# Patient Record
Sex: FEMALE | Race: WHITE | NOT HISPANIC OR LATINO | Employment: FULL TIME | URBAN - METROPOLITAN AREA
[De-identification: names, ages, dates, MRNs, and addresses within clinical notes are randomized per-mention and may not be internally consistent; named-entity substitution may affect disease eponyms.]

---

## 2019-04-22 ENCOUNTER — OFFICE VISIT (OUTPATIENT)
Dept: URGENT CARE | Facility: CLINIC | Age: 59
End: 2019-04-22
Payer: COMMERCIAL

## 2019-04-22 VITALS
WEIGHT: 142 LBS | BODY MASS INDEX: 23.66 KG/M2 | TEMPERATURE: 98.2 F | DIASTOLIC BLOOD PRESSURE: 72 MMHG | HEART RATE: 85 BPM | HEIGHT: 65 IN | OXYGEN SATURATION: 99 % | SYSTOLIC BLOOD PRESSURE: 110 MMHG | RESPIRATION RATE: 20 BRPM

## 2019-04-22 DIAGNOSIS — R12 HEARTBURN: Primary | ICD-10-CM

## 2019-04-22 PROCEDURE — 99213 OFFICE O/P EST LOW 20 MIN: CPT | Performed by: PHYSICIAN ASSISTANT

## 2019-04-22 PROCEDURE — 93005 ELECTROCARDIOGRAM TRACING: CPT | Performed by: PHYSICIAN ASSISTANT

## 2019-04-22 RX ORDER — CHLORAL HYDRATE 500 MG
1000 CAPSULE ORAL DAILY
COMMUNITY

## 2019-04-22 RX ORDER — FAMOTIDINE 20 MG/1
20 TABLET, FILM COATED ORAL 2 TIMES DAILY
Qty: 28 TABLET | Refills: 0 | Status: SHIPPED | OUTPATIENT
Start: 2019-04-22 | End: 2021-10-08

## 2019-04-22 RX ORDER — FLUOXETINE 10 MG/1
10 CAPSULE ORAL DAILY
COMMUNITY

## 2019-04-22 RX ORDER — DOCUSATE SODIUM 100 MG/1
100 CAPSULE, LIQUID FILLED ORAL 2 TIMES DAILY
COMMUNITY

## 2019-04-22 RX ORDER — MAGNESIUM HYDROXIDE/ALUMINUM HYDROXICE/SIMETHICONE 120; 1200; 1200 MG/30ML; MG/30ML; MG/30ML
30 SUSPENSION ORAL ONCE
Status: COMPLETED | OUTPATIENT
Start: 2019-04-22 | End: 2019-04-22

## 2019-04-22 RX ORDER — VALACYCLOVIR HYDROCHLORIDE 1 G/1
1000 TABLET, FILM COATED ORAL 2 TIMES DAILY
COMMUNITY

## 2019-04-22 RX ORDER — ATORVASTATIN CALCIUM 20 MG/1
20 TABLET, FILM COATED ORAL DAILY
COMMUNITY

## 2019-04-22 RX ORDER — BUTALBITAL/ASPIRIN/CAFFEINE 50-325-40
1 CAPSULE ORAL EVERY 4 HOURS PRN
COMMUNITY

## 2019-04-22 RX ORDER — WHEAT DEXTRIN 3 G/3.8 G
POWDER (GRAM) ORAL
COMMUNITY

## 2019-04-22 RX ORDER — DICYCLOMINE HYDROCHLORIDE 10 MG/1
10 CAPSULE ORAL ONCE
Status: COMPLETED | OUTPATIENT
Start: 2019-04-22 | End: 2019-04-22

## 2019-04-22 RX ORDER — ELECTROLYTES/DEXTROSE
SOLUTION, ORAL ORAL
COMMUNITY

## 2019-04-22 RX ADMIN — MAGNESIUM HYDROXIDE/ALUMINUM HYDROXICE/SIMETHICONE 30 ML: 120; 1200; 1200 SUSPENSION ORAL at 14:20

## 2019-04-22 RX ADMIN — DICYCLOMINE HYDROCHLORIDE 10 MG: 10 CAPSULE ORAL at 14:21

## 2019-04-23 LAB
ATRIAL RATE: 81 BPM
P AXIS: 74 DEGREES
PR INTERVAL: 184 MS
QRS AXIS: 66 DEGREES
QRSD INTERVAL: 88 MS
QT INTERVAL: 368 MS
QTC INTERVAL: 427 MS
T WAVE AXIS: 59 DEGREES
VENTRICULAR RATE: 81 BPM

## 2019-04-23 PROCEDURE — 93010 ELECTROCARDIOGRAM REPORT: CPT | Performed by: INTERNAL MEDICINE

## 2021-04-13 DIAGNOSIS — Z23 ENCOUNTER FOR IMMUNIZATION: ICD-10-CM

## 2021-10-08 ENCOUNTER — OFFICE VISIT (OUTPATIENT)
Dept: URGENT CARE | Facility: CLINIC | Age: 61
End: 2021-10-08
Payer: COMMERCIAL

## 2021-10-08 VITALS
DIASTOLIC BLOOD PRESSURE: 81 MMHG | HEIGHT: 65 IN | HEART RATE: 91 BPM | RESPIRATION RATE: 18 BRPM | BODY MASS INDEX: 22.99 KG/M2 | SYSTOLIC BLOOD PRESSURE: 135 MMHG | WEIGHT: 138 LBS | TEMPERATURE: 97.9 F | OXYGEN SATURATION: 99 %

## 2021-10-08 DIAGNOSIS — H65.03 NON-RECURRENT ACUTE SEROUS OTITIS MEDIA OF BOTH EARS: Primary | ICD-10-CM

## 2021-10-08 PROCEDURE — 99213 OFFICE O/P EST LOW 20 MIN: CPT | Performed by: EMERGENCY MEDICINE

## 2021-10-08 RX ORDER — OMEPRAZOLE 40 MG/1
40 CAPSULE, DELAYED RELEASE ORAL DAILY
COMMUNITY
Start: 2021-08-17

## 2021-10-08 RX ORDER — PRAVASTATIN SODIUM 40 MG
40 TABLET ORAL DAILY
COMMUNITY
Start: 2021-10-05

## 2021-10-08 RX ORDER — PREDNISONE 10 MG/1
TABLET ORAL
Qty: 27 TABLET | Refills: 0 | Status: SHIPPED | OUTPATIENT
Start: 2021-10-08

## 2022-09-09 ENCOUNTER — HOSPITAL ENCOUNTER (OUTPATIENT)
Dept: CT IMAGING | Facility: HOSPITAL | Age: 62
End: 2022-09-09
Payer: COMMERCIAL

## 2022-09-09 DIAGNOSIS — R10.9 STOMACH ACHE: ICD-10-CM

## 2022-09-09 DIAGNOSIS — R19.5 ABNORMAL FECES: ICD-10-CM

## 2022-09-09 PROCEDURE — 74177 CT ABD & PELVIS W/CONTRAST: CPT

## 2022-09-09 PROCEDURE — G1004 CDSM NDSC: HCPCS

## 2022-09-09 RX ADMIN — IOHEXOL 85 ML: 350 INJECTION, SOLUTION INTRAVENOUS at 13:29

## 2022-09-22 ENCOUNTER — HOSPITAL ENCOUNTER (OUTPATIENT)
Dept: NUCLEAR MEDICINE | Facility: HOSPITAL | Age: 62
Discharge: HOME | End: 2022-09-22
Payer: COMMERCIAL

## 2022-09-22 DIAGNOSIS — R19.7 DIARRHEA OF PRESUMED INFECTIOUS ORIGIN: ICD-10-CM

## 2022-09-22 DIAGNOSIS — R10.10 UPPER ABDOMINAL PAIN: ICD-10-CM

## 2022-09-22 DIAGNOSIS — R11.0 NAUSEA: ICD-10-CM

## 2022-09-22 PROCEDURE — 78227 HEPATOBIL SYST IMAGE W/DRUG: CPT

## 2022-09-22 PROCEDURE — A9537 TC99M MEBROFENIN: HCPCS

## 2024-03-22 ENCOUNTER — APPOINTMENT (OUTPATIENT)
Dept: LAB | Facility: HOSPITAL | Age: 64
End: 2024-03-22

## 2024-03-22 DIAGNOSIS — Z00.6 ENCOUNTER FOR EXAMINATION FOR NORMAL COMPARISON OR CONTROL IN CLINICAL RESEARCH PROGRAM: ICD-10-CM

## 2024-03-22 PROCEDURE — 36415 COLL VENOUS BLD VENIPUNCTURE: CPT

## 2024-04-12 LAB
APOB+LDLR+PCSK9 GENE MUT ANL BLD/T: NOT DETECTED
BRCA1+BRCA2 DEL+DUP + FULL MUT ANL BLD/T: NOT DETECTED
MLH1+MSH2+MSH6+PMS2 GN DEL+DUP+FUL M: NOT DETECTED

## 2025-03-18 ENCOUNTER — HOSPITAL ENCOUNTER (OUTPATIENT)
Dept: RADIOLOGY | Facility: CLINIC | Age: 65
Discharge: HOME | End: 2025-03-18
Payer: MEDICARE

## 2025-03-18 ENCOUNTER — OFFICE VISIT (OUTPATIENT)
Dept: OBGYN CLINIC | Facility: CLINIC | Age: 65
End: 2025-03-18
Payer: MEDICARE

## 2025-03-18 VITALS — HEIGHT: 65 IN | BODY MASS INDEX: 23.16 KG/M2 | WEIGHT: 139 LBS

## 2025-03-18 DIAGNOSIS — M25.562 LEFT KNEE PAIN, UNSPECIFIED CHRONICITY: ICD-10-CM

## 2025-03-18 DIAGNOSIS — M70.42 PREPATELLAR BURSITIS OF LEFT KNEE: Primary | ICD-10-CM

## 2025-03-18 PROCEDURE — 73562 X-RAY EXAM OF KNEE 3: CPT

## 2025-03-18 PROCEDURE — 99203 OFFICE O/P NEW LOW 30 MIN: CPT | Performed by: STUDENT IN AN ORGANIZED HEALTH CARE EDUCATION/TRAINING PROGRAM

## 2025-03-18 PROCEDURE — 73564 X-RAY EXAM KNEE 4 OR MORE: CPT

## 2025-03-18 RX ORDER — MULTIVIT-MIN/IRON/FOLIC ACID/K 18-600-40
CAPSULE ORAL
COMMUNITY

## 2025-03-18 RX ORDER — BUSPIRONE HYDROCHLORIDE 15 MG/1
TABLET ORAL
COMMUNITY
Start: 2023-05-22

## 2025-03-18 RX ORDER — PRAVASTATIN SODIUM 80 MG/1
TABLET ORAL
COMMUNITY
Start: 2025-02-24

## 2025-03-18 NOTE — PROGRESS NOTES
- Last A1c 6/12/23: 5.9.   - On metformin. Wieght is getting worse, is not complaint to metformin given headaches.   - Will try wegovy, patient not compliant with metformin. Indication is obesity.    Assessment & Plan  Prepatellar bursitis of left knee  Reviewed with patient at time of visit that physical exam and imaging demonstrate prepatellar bursitis of Left knee. Educated on the etiology of this condition. Discussed that we typically do not aspirate the bursa sac due to risk of infection and return of swelling. Discussed  treatment options including OTC anti-inflammatories, compression sleeves and avoidance of aggravating activities. She has no limitation to her activities. Discussed that if the knee becomes red, warm or show signs of infection she should reach out to the office or report to Urgent Care/ED. She will follow up on an as needed basis. The patient expresses understanding and is in agreement with today's treatment plan. They may contact the office with any question or concerns.       Orders:    XR knee 3 vw right non injury; Future    XR knee 4+ vw left injury; Future        General  Chief Complaint   Patient presents with    Left Knee - New Patient Visit, Swelling     Fell 2/14, feels tender, can't kneel on it        Moi Gallego is a 64 y.o. female who presents with LEFT knee pain:    Chief Complaint   Patient presents with    Left Knee - New Patient Visit, Swelling     Fell 2/14, feels tender, can't kneel on it          History of Present Illness   The patient complains of left knee pain. The pain started 1 months ago with injury. At that time the patient describes she fell directly onto the knee. Since that time, she has had a pocket of swelling that will not go away.     The pain is 1/10. The pain is located Anterior. The pain is described as dull and aching. They have tried NSAIDS for their problem. Pain improves with rest and nonweightbearing. Pain worsens with kneeling.    The patient did not hear a pop. The patient does have swelling.  The patient does not feel unstable.    [facility] Patient Answers  Failed to redirect to the Timeline version of the REVFS  SmartLink.  Allergies   Allergen Reactions    Azithromycin GI Intolerance    Molds & Smuts Other (See Comments)     reaction to pancakes   reaction to pancakes    reaction to pancakes with mold    reaction to pancakes    Penicillins Rash    Terbinafine Other (See Comments)     Loss of taste    Other reaction(s): Other (Please comment), Other (See Comments)   Loss of taste   Loss of taste     Outpatient Encounter Medications as of 3/18/2025   Medication Sig Dispense Refill    busPIRone (BUSPAR) 15 mg tablet Take by mouth      butalbital-acetaminophen-caffeine-codeine (FIORICET WITH CODEINE) -99-30 MG per capsule Take 1 capsule by mouth every 4 (four) hours as needed for headaches      Cholecalciferol (Vitamin D) 50 MCG (2000 UT) CAPS       docusate sodium (COLACE) 100 mg capsule Take 100 mg by mouth 2 (two) times a day      Multiple Vitamins-Minerals (MULTIVITAMIN ADULT) TABS Take by mouth      Omega-3 Fatty Acids (FISH OIL) 1,000 mg Take 1,000 mg by mouth daily      pravastatin (PRAVACHOL) 80 mg tablet       Wheat Dextrin (BENEFIBER) POWD Take by mouth      [DISCONTINUED] pravastatin (PRAVACHOL) 40 mg tablet Take 40 mg by mouth daily (Patient taking differently: Take 80 mg by mouth daily)      PROBIOTIC PRODUCT PO Take by mouth (Patient not taking: Reported on 3/18/2025)      valACYclovir (VALTREX) 1,000 mg tablet Take 1,000 mg by mouth 2 (two) times a day (Patient not taking: Reported on 3/18/2025)      [DISCONTINUED] atorvastatin (LIPITOR) 20 mg tablet Take 20 mg by mouth daily (Patient not taking: Reported on 10/8/2021)      [DISCONTINUED] Doxycycline Hyclate 120 MG TBEC Take 20 mg by mouth (Patient not taking: Reported on 10/8/2021)      [DISCONTINUED] estrogens conjugated, synthetic B, (ENJUVIA) 0.625 MG tablet Take 0.625 mg by mouth daily (Patient not taking: Reported on 10/8/2021)      [DISCONTINUED] famotidine (PEPCID) 20 mg tablet Take 1 tablet (20 mg total) by mouth 2 (two) times a day for 14 days 28  tablet 0    [DISCONTINUED] FLUoxetine (PROzac) 10 mg capsule Take 10 mg by mouth daily (Patient not taking: Reported on 10/8/2021)      [DISCONTINUED] omeprazole (PriLOSEC) 40 MG capsule Take 40 mg by mouth daily      [DISCONTINUED] predniSONE 10 mg tablet Take once daily all days pills on this schedule 6- 6- 5- 4- 3- 2- 1 27 tablet 0     No facility-administered encounter medications on file as of 3/18/2025.     Past Medical History:   Diagnosis Date    Allergic     GERD (gastroesophageal reflux disease)     High cholesterol      Past Surgical History:   Procedure Laterality Date    NO PAST SURGERIES       History reviewed. No pertinent family history.  Social History     Tobacco Use    Smoking status: Never    Smokeless tobacco: Never   Vaping Use    Vaping status: Never Used   Substance Use Topics    Alcohol use: Yes     Alcohol/week: 1.0 standard drink of alcohol     Types: 1 Glasses of wine per week    Drug use: Never     Comment: occ           Objective    There were no vitals filed for this visit.  Body mass index is 23.13 kg/m².  Physical Exam  Vitals and nursing note reviewed.   Constitutional:       General: She is not in acute distress.     Appearance: She is well-developed.   HENT:      Head: Normocephalic and atraumatic.   Eyes:      Conjunctiva/sclera: Conjunctivae normal.   Cardiovascular:      Rate and Rhythm: Normal rate and regular rhythm.      Heart sounds: No murmur heard.  Pulmonary:      Effort: Pulmonary effort is normal. No respiratory distress.      Breath sounds: Normal breath sounds.   Abdominal:      Palpations: Abdomen is soft.      Tenderness: There is no abdominal tenderness.   Musculoskeletal:         General: No swelling.      Cervical back: Neck supple.   Skin:     General: Skin is warm and dry.      Capillary Refill: Capillary refill takes less than 2 seconds.   Neurological:      Mental Status: She is alert.   Psychiatric:         Mood and Affect: Mood normal.       Knee Exam      Left   Inspection: Mild swelling  at the prepatella bursa   Gait: Normal   Quadriceps atrophy: None     Tenderness: Patellar Tendon   ROM: 0-120   Effusion: None   Meniscus Exam   Left   Medial Meniscus: None   Lateral Meniscus: None   Ligament Exam   Left   ACL Lachman: negative    Anterior Drawer:negative     PCL Posterior Drawer:negative   MCL Stable at 0 and 30 degrees of flexion   LCL Stable at 0 and 30 degrees of flexion   PLC Deferred     Patellofemoral exam   Left   Patella grind test negative   Patella instability: negative  1 Quadrants of translation   Patellar Translation Apprehension negative     Distally the patient's neurovascular status is normal.    Review of Prior Testing  I independently interpreted the following test: X-rays of the left knee taken today, including weight bearing and merchant views.  Multiple views of the knee show the joint spaces to be maintained, no fractures, no malalignment. Soft tissue swelling over the pre-patellar bursa.             Follow Up: Return if symptoms worsen or fail to improve.    All questions answered and patient agrees with plan.      Scribe Attestation      I,:  Siobhan Thompson am acting as a scribe while in the presence of the attending physician.:       I,:  Evaristo Floyd MD personally performed the services described in this documentation    as scribed in my presence.: